# Patient Record
Sex: MALE | Race: ASIAN | NOT HISPANIC OR LATINO
[De-identification: names, ages, dates, MRNs, and addresses within clinical notes are randomized per-mention and may not be internally consistent; named-entity substitution may affect disease eponyms.]

---

## 2017-10-20 ENCOUNTER — TRANSCRIPTION ENCOUNTER (OUTPATIENT)
Age: 42
End: 2017-10-20

## 2017-10-21 ENCOUNTER — TRANSCRIPTION ENCOUNTER (OUTPATIENT)
Age: 42
End: 2017-10-21

## 2017-10-21 ENCOUNTER — INPATIENT (INPATIENT)
Facility: HOSPITAL | Age: 42
LOS: 0 days | Discharge: HOME | DRG: 340 | End: 2017-10-22
Attending: SURGERY | Admitting: SURGERY
Payer: COMMERCIAL

## 2017-10-21 VITALS
TEMPERATURE: 98 F | HEART RATE: 93 BPM | HEIGHT: 70 IN | RESPIRATION RATE: 16 BRPM | SYSTOLIC BLOOD PRESSURE: 121 MMHG | WEIGHT: 199.96 LBS | DIASTOLIC BLOOD PRESSURE: 84 MMHG | OXYGEN SATURATION: 99 %

## 2017-10-21 DIAGNOSIS — K35.3 ACUTE APPENDICITIS WITH LOCALIZED PERITONITIS: ICD-10-CM

## 2017-10-21 DIAGNOSIS — K35.80 UNSPECIFIED ACUTE APPENDICITIS: ICD-10-CM

## 2017-10-21 LAB
ALBUMIN SERPL ELPH-MCNC: 4.4 G/DL — SIGNIFICANT CHANGE UP (ref 3.3–5)
ALP SERPL-CCNC: 58 U/L — SIGNIFICANT CHANGE UP (ref 40–120)
ALT FLD-CCNC: 28 U/L — SIGNIFICANT CHANGE UP (ref 12–78)
ANION GAP SERPL CALC-SCNC: 9 MMOL/L — SIGNIFICANT CHANGE UP (ref 5–17)
APPEARANCE UR: ABNORMAL
APTT BLD: 28.8 SEC — SIGNIFICANT CHANGE UP (ref 27.5–37.4)
AST SERPL-CCNC: 12 U/L — LOW (ref 15–37)
BACTERIA # UR AUTO: ABNORMAL
BASOPHILS # BLD AUTO: 0.1 K/UL — SIGNIFICANT CHANGE UP (ref 0–0.2)
BASOPHILS NFR BLD AUTO: 0.5 % — SIGNIFICANT CHANGE UP (ref 0–2)
BILIRUB SERPL-MCNC: 0.9 MG/DL — SIGNIFICANT CHANGE UP (ref 0.2–1.2)
BILIRUB UR-MCNC: NEGATIVE — SIGNIFICANT CHANGE UP
BLD GP AB SCN SERPL QL: SIGNIFICANT CHANGE UP
BUN SERPL-MCNC: 15 MG/DL — SIGNIFICANT CHANGE UP (ref 7–23)
CALCIUM SERPL-MCNC: 9.3 MG/DL — SIGNIFICANT CHANGE UP (ref 8.5–10.1)
CHLORIDE SERPL-SCNC: 100 MMOL/L — SIGNIFICANT CHANGE UP (ref 96–108)
CO2 SERPL-SCNC: 30 MMOL/L — SIGNIFICANT CHANGE UP (ref 22–31)
COLOR SPEC: YELLOW — SIGNIFICANT CHANGE UP
COMMENT - URINE: SIGNIFICANT CHANGE UP
CREAT SERPL-MCNC: 1.1 MG/DL — SIGNIFICANT CHANGE UP (ref 0.5–1.3)
DIFF PNL FLD: ABNORMAL
EOSINOPHIL # BLD AUTO: 0 K/UL — SIGNIFICANT CHANGE UP (ref 0–0.5)
EOSINOPHIL NFR BLD AUTO: 0.1 % — SIGNIFICANT CHANGE UP (ref 0–6)
EPI CELLS # UR: SIGNIFICANT CHANGE UP
GLUCOSE SERPL-MCNC: 107 MG/DL — HIGH (ref 70–99)
GLUCOSE UR QL: NEGATIVE — SIGNIFICANT CHANGE UP
HCT VFR BLD CALC: 51.3 % — HIGH (ref 39–50)
HGB BLD-MCNC: 17.3 G/DL — HIGH (ref 13–17)
INR BLD: 1.07 RATIO — SIGNIFICANT CHANGE UP (ref 0.88–1.16)
KETONES UR-MCNC: ABNORMAL
LACTATE SERPL-SCNC: 1.4 MMOL/L — SIGNIFICANT CHANGE UP (ref 0.7–2)
LEUKOCYTE ESTERASE UR-ACNC: ABNORMAL
LIDOCAIN IGE QN: 187 U/L — SIGNIFICANT CHANGE UP (ref 73–393)
LYMPHOCYTES # BLD AUTO: 1.9 K/UL — SIGNIFICANT CHANGE UP (ref 1–3.3)
LYMPHOCYTES # BLD AUTO: 12 % — LOW (ref 13–44)
MCHC RBC-ENTMCNC: 30 PG — SIGNIFICANT CHANGE UP (ref 27–34)
MCHC RBC-ENTMCNC: 33.8 GM/DL — SIGNIFICANT CHANGE UP (ref 32–36)
MCV RBC AUTO: 88.9 FL — SIGNIFICANT CHANGE UP (ref 80–100)
MONOCYTES # BLD AUTO: 1.1 K/UL — HIGH (ref 0–0.9)
MONOCYTES NFR BLD AUTO: 6.8 % — SIGNIFICANT CHANGE UP (ref 1–9)
NEUTROPHILS # BLD AUTO: 12.8 K/UL — HIGH (ref 1.8–7.4)
NEUTROPHILS NFR BLD AUTO: 80.7 % — HIGH (ref 43–77)
NITRITE UR-MCNC: NEGATIVE — SIGNIFICANT CHANGE UP
PH UR: 7 — SIGNIFICANT CHANGE UP (ref 5–8)
PLATELET # BLD AUTO: 224 K/UL — SIGNIFICANT CHANGE UP (ref 150–400)
POTASSIUM SERPL-MCNC: 3.8 MMOL/L — SIGNIFICANT CHANGE UP (ref 3.5–5.3)
POTASSIUM SERPL-SCNC: 3.8 MMOL/L — SIGNIFICANT CHANGE UP (ref 3.5–5.3)
PROT SERPL-MCNC: 8.8 G/DL — HIGH (ref 6–8.3)
PROT UR-MCNC: NEGATIVE — SIGNIFICANT CHANGE UP
PROTHROM AB SERPL-ACNC: 11.7 SEC — SIGNIFICANT CHANGE UP (ref 9.8–12.7)
RBC # BLD: 5.77 M/UL — SIGNIFICANT CHANGE UP (ref 4.2–5.8)
RBC # FLD: 11.4 % — SIGNIFICANT CHANGE UP (ref 10.3–14.5)
RBC CASTS # UR COMP ASSIST: ABNORMAL /HPF (ref 0–4)
SODIUM SERPL-SCNC: 139 MMOL/L — SIGNIFICANT CHANGE UP (ref 135–145)
SP GR SPEC: 1.01 — SIGNIFICANT CHANGE UP (ref 1.01–1.02)
UROBILINOGEN FLD QL: NEGATIVE — SIGNIFICANT CHANGE UP
WBC # BLD: 15.9 K/UL — HIGH (ref 3.8–10.5)
WBC # FLD AUTO: 15.9 K/UL — HIGH (ref 3.8–10.5)
WBC UR QL: SIGNIFICANT CHANGE UP

## 2017-10-21 PROCEDURE — 88304 TISSUE EXAM BY PATHOLOGIST: CPT | Mod: 26

## 2017-10-21 PROCEDURE — 99285 EMERGENCY DEPT VISIT HI MDM: CPT

## 2017-10-21 PROCEDURE — 93010 ELECTROCARDIOGRAM REPORT: CPT

## 2017-10-21 PROCEDURE — 74176 CT ABD & PELVIS W/O CONTRAST: CPT | Mod: 26

## 2017-10-21 RX ORDER — SODIUM CHLORIDE 9 MG/ML
3 INJECTION INTRAMUSCULAR; INTRAVENOUS; SUBCUTANEOUS ONCE
Qty: 0 | Refills: 0 | Status: COMPLETED | OUTPATIENT
Start: 2017-10-21 | End: 2017-10-21

## 2017-10-21 RX ORDER — ONDANSETRON 8 MG/1
4 TABLET, FILM COATED ORAL EVERY 6 HOURS
Qty: 0 | Refills: 0 | Status: DISCONTINUED | OUTPATIENT
Start: 2017-10-21 | End: 2017-10-22

## 2017-10-21 RX ORDER — SODIUM CHLORIDE 9 MG/ML
1000 INJECTION INTRAMUSCULAR; INTRAVENOUS; SUBCUTANEOUS
Qty: 0 | Refills: 0 | Status: DISCONTINUED | OUTPATIENT
Start: 2017-10-21 | End: 2017-10-21

## 2017-10-21 RX ORDER — MORPHINE SULFATE 50 MG/1
4 CAPSULE, EXTENDED RELEASE ORAL ONCE
Qty: 0 | Refills: 0 | Status: DISCONTINUED | OUTPATIENT
Start: 2017-10-21 | End: 2017-10-21

## 2017-10-21 RX ORDER — SODIUM CHLORIDE 9 MG/ML
1000 INJECTION INTRAMUSCULAR; INTRAVENOUS; SUBCUTANEOUS ONCE
Qty: 0 | Refills: 0 | Status: DISCONTINUED | OUTPATIENT
Start: 2017-10-21 | End: 2017-10-22

## 2017-10-21 RX ORDER — OXYCODONE AND ACETAMINOPHEN 5; 325 MG/1; MG/1
2 TABLET ORAL EVERY 4 HOURS
Qty: 0 | Refills: 0 | Status: DISCONTINUED | OUTPATIENT
Start: 2017-10-21 | End: 2017-10-22

## 2017-10-21 RX ORDER — SODIUM CHLORIDE 9 MG/ML
1000 INJECTION, SOLUTION INTRAVENOUS
Qty: 0 | Refills: 0 | Status: DISCONTINUED | OUTPATIENT
Start: 2017-10-21 | End: 2017-10-22

## 2017-10-21 RX ORDER — ENOXAPARIN SODIUM 100 MG/ML
40 INJECTION SUBCUTANEOUS DAILY
Qty: 0 | Refills: 0 | Status: DISCONTINUED | OUTPATIENT
Start: 2017-10-21 | End: 2017-10-22

## 2017-10-21 RX ORDER — ONDANSETRON 8 MG/1
4 TABLET, FILM COATED ORAL ONCE
Qty: 0 | Refills: 0 | Status: COMPLETED | OUTPATIENT
Start: 2017-10-21 | End: 2017-10-21

## 2017-10-21 RX ORDER — PIPERACILLIN AND TAZOBACTAM 4; .5 G/20ML; G/20ML
3.38 INJECTION, POWDER, LYOPHILIZED, FOR SOLUTION INTRAVENOUS ONCE
Qty: 0 | Refills: 0 | Status: DISCONTINUED | OUTPATIENT
Start: 2017-10-21 | End: 2017-10-21

## 2017-10-21 RX ORDER — INFLUENZA VIRUS VACCINE 15; 15; 15; 15 UG/.5ML; UG/.5ML; UG/.5ML; UG/.5ML
0.5 SUSPENSION INTRAMUSCULAR ONCE
Qty: 0 | Refills: 0 | Status: DISCONTINUED | OUTPATIENT
Start: 2017-10-21 | End: 2017-10-22

## 2017-10-21 RX ORDER — ATORVASTATIN CALCIUM 80 MG/1
0 TABLET, FILM COATED ORAL
Qty: 0 | Refills: 0 | COMMUNITY

## 2017-10-21 RX ORDER — ACETAMINOPHEN 500 MG
650 TABLET ORAL EVERY 6 HOURS
Qty: 0 | Refills: 0 | Status: DISCONTINUED | OUTPATIENT
Start: 2017-10-21 | End: 2017-10-22

## 2017-10-21 RX ORDER — HYDROMORPHONE HYDROCHLORIDE 2 MG/ML
0.5 INJECTION INTRAMUSCULAR; INTRAVENOUS; SUBCUTANEOUS
Qty: 0 | Refills: 0 | Status: DISCONTINUED | OUTPATIENT
Start: 2017-10-21 | End: 2017-10-21

## 2017-10-21 RX ORDER — HYDROMORPHONE HYDROCHLORIDE 2 MG/ML
1 INJECTION INTRAMUSCULAR; INTRAVENOUS; SUBCUTANEOUS
Qty: 0 | Refills: 0 | Status: DISCONTINUED | OUTPATIENT
Start: 2017-10-21 | End: 2017-10-22

## 2017-10-21 RX ADMIN — SODIUM CHLORIDE 125 MILLILITER(S): 9 INJECTION INTRAMUSCULAR; INTRAVENOUS; SUBCUTANEOUS at 13:14

## 2017-10-21 RX ADMIN — HYDROMORPHONE HYDROCHLORIDE 0.5 MILLIGRAM(S): 2 INJECTION INTRAMUSCULAR; INTRAVENOUS; SUBCUTANEOUS at 17:40

## 2017-10-21 RX ADMIN — HYDROMORPHONE HYDROCHLORIDE 0.5 MILLIGRAM(S): 2 INJECTION INTRAMUSCULAR; INTRAVENOUS; SUBCUTANEOUS at 18:10

## 2017-10-21 RX ADMIN — MORPHINE SULFATE 4 MILLIGRAM(S): 50 CAPSULE, EXTENDED RELEASE ORAL at 15:09

## 2017-10-21 RX ADMIN — ONDANSETRON 4 MILLIGRAM(S): 8 TABLET, FILM COATED ORAL at 13:14

## 2017-10-21 RX ADMIN — SODIUM CHLORIDE 3 MILLILITER(S): 9 INJECTION INTRAMUSCULAR; INTRAVENOUS; SUBCUTANEOUS at 13:17

## 2017-10-21 RX ADMIN — SODIUM CHLORIDE 125 MILLILITER(S): 9 INJECTION, SOLUTION INTRAVENOUS at 20:18

## 2017-10-21 RX ADMIN — MORPHINE SULFATE 4 MILLIGRAM(S): 50 CAPSULE, EXTENDED RELEASE ORAL at 14:11

## 2017-10-21 RX ADMIN — SODIUM CHLORIDE 100 MILLILITER(S): 9 INJECTION INTRAMUSCULAR; INTRAVENOUS; SUBCUTANEOUS at 17:40

## 2017-10-21 RX ADMIN — MORPHINE SULFATE 4 MILLIGRAM(S): 50 CAPSULE, EXTENDED RELEASE ORAL at 13:14

## 2017-10-21 NOTE — DISCHARGE NOTE ADULT - CARE PLAN
Principal Discharge DX:	Acute appendicitis with localized peritonitis  Goal:	Relief of pain  Instructions for follow-up, activity and diet:	low cholesterol diet.  No heavy lifting or strenuous exercise for 4 weeks.  No driving or operating heavy machinery while taking narcotics.  May shower in 48 hours.  Leave steri strips.  If they fall off, its ok. f/u with Dr. Rubio in 10 days  Secondary Diagnosis:	Hyperlipidemia, unspecified hyperlipidemia type

## 2017-10-21 NOTE — ED PROVIDER NOTE - NSCAREINITIATED _GEN_ER
seen and examined with fellow. I agree with H & P, A & P.  Possible post op arrhythmia from his noncardiac surgery.  Agree with Xarelto and KAREN guided D[facility]. Gael Stack(Attending)

## 2017-10-21 NOTE — H&P ADULT - HISTORY OF PRESENT ILLNESS
42 y.o. M c/o abd pain which began in the epigastrium ~ 18 hours ago, then localized to the RLQ today.  He vomited multiple times.  He denies change in bowel habits.  He had eaten some appetizers for dinner before the pain started.  He denies fever or chills.  He denies back pain.  He denies dysuria or hematuria.  He has never had an upper or lower endoscopy.  He denies sick contacts or recent travel.

## 2017-10-21 NOTE — ED PROVIDER NOTE - PROGRESS NOTE DETAILS
dr gama paged on call surgery  there was no call from radiology regarding the ct, results found on review of pt data

## 2017-10-21 NOTE — BRIEF OPERATIVE NOTE - PROCEDURE
<<-----Click on this checkbox to enter Procedure Laparoscopic appendectomy  10/21/2017    Active  CARLOS

## 2017-10-21 NOTE — ED ADULT TRIAGE NOTE - CHIEF COMPLAINT QUOTE
pt was sent from urgent care for r/o appy, RUQ pain with nausea since last night pt was sent from urgent care for r/o appy, RLQ pain with nausea since last night

## 2017-10-21 NOTE — H&P ADULT - PROBLEM SELECTOR PLAN 1
Admit, NPO, IVF, IV Abx   I recommended appendectomy.  I explained that I will attempt to perform the procedure laparoscopically, but that there is always a risk of need for conversion to an open procedure.  I explained that the risks of the procedure are including, but not limited to, bleeding, infection, visceral or ureteral injury, and hernia.  I explained that if there is any evidence of pus, gangrene, or perforation, that he would require extended hospitalization for intravenous antibiotics.  He agreed for the procedure.  He will void immediately prior to the procedure.

## 2017-10-21 NOTE — H&P ADULT - NSHPREVIEWOFSYSTEMS_GEN_ALL_CORE
The patient denies headache, dizziness, sore throat, nasal congestion, chest pain, SOB, extremity pain, numbness, tingling, or weakness.

## 2017-10-21 NOTE — H&P ADULT - NSHPPHYSICALEXAM_GEN_ALL_CORE
Vital Signs Last 24 Hrs  T(C): 36.8 (21 Oct 2017 11:50), Max: 36.8 (21 Oct 2017 11:50)  T(F): 98.2 (21 Oct 2017 11:50), Max: 98.2 (21 Oct 2017 11:50)  HR: 97 (21 Oct 2017 14:44) (93 - 97)  BP: 106/68 (21 Oct 2017 14:44) (106/68 - 121/84)  BP(mean): --  RR: 15 (21 Oct 2017 14:44) (15 - 16)  SpO2: 98% (21 Oct 2017 14:44) (98% - 99%)      Physical Exam:  General: AAOx3; Comfortable  HEENT: No jaundice or icterus; No cervical adenopathy  Lungs: BCTA  Heart: RRR  Abd: Soft, ND, Mild LLQ tenderness; Mod RLQ tenderness; No guarding, rebound tenderness, or rigidity; + Psoas sign: neg Obturator and Rovsing's signs; No hernias; No scars  Back: No CVA tenderness  Extremities: No edema or calf tenderness; Good pulses b/l

## 2017-10-21 NOTE — DISCHARGE NOTE ADULT - HOSPITAL COURSE
Admitted, kept NPO.  Given IV hydration and IV abx.  Underwent uncomplicated surgery.  Ambulated.  Voided.  Discharged home when pain controlled.

## 2017-10-21 NOTE — DISCHARGE NOTE ADULT - MEDICATION SUMMARY - MEDICATIONS TO TAKE
I will START or STAY ON the medications listed below when I get home from the hospital:    oxyCODONE-acetaminophen 5 mg-325 mg oral tablet  -- 1 tab(s) by mouth every 4 hours (5 times/day), As Needed -for severe pain MDD:12 tabs  -- Caution federal law prohibits the transfer of this drug to any person other  than the person for whom it was prescribed.  May cause drowsiness.  Alcohol may intensify this effect.  Use care when operating dangerous machinery.  This prescription cannot be refilled.  This product contains acetaminophen.  Do not use  with any other product containing acetaminophen to prevent possible liver damage.  Using more of this medication than prescribed may cause serious breathing problems.    -- Indication: For Acute appendicitis I will START or STAY ON the medications listed below when I get home from the hospital:    oxyCODONE-acetaminophen 5 mg-325 mg oral tablet  -- 1 tab(s) by mouth every 4 hours (5 times/day), As Needed -for severe pain MDD:12 tabs  -- Caution federal law prohibits the transfer of this drug to any person other  than the person for whom it was prescribed.  May cause drowsiness.  Alcohol may intensify this effect.  Use care when operating dangerous machinery.  This prescription cannot be refilled.  This product contains acetaminophen.  Do not use  with any other product containing acetaminophen to prevent possible liver damage.  Using more of this medication than prescribed may cause serious breathing problems.    -- Indication: For pain

## 2017-10-21 NOTE — DISCHARGE NOTE ADULT - PATIENT PORTAL LINK FT
“You can access the [employer] Patient Portal, offered by [facility], by registering with the following website: [email or web address]”

## 2017-10-21 NOTE — DISCHARGE NOTE ADULT - PLAN OF CARE
Relief of pain low cholesterol diet.  No heavy lifting or strenuous exercise for 4 weeks.  No driving or operating heavy machinery while taking narcotics.  May shower in 48 hours.  Leave steri strips.  If they fall off, its ok. f/u with Dr. Rubio in 10 days

## 2017-10-21 NOTE — ED PROVIDER NOTE - CONSTITUTIONAL, MLM
normal... Well appearing, well nourished, awake, alert, oriented to person, place, time/situation holding abd in pain

## 2017-10-21 NOTE — H&P ADULT - NSHPLABSRESULTS_GEN_ALL_CORE
LABS:  CBC Full  -  ( 21 Oct 2017 13:26 )  WBC Count : 15.9 K/uL  Hemoglobin : 17.3 g/dL  Hematocrit : 51.3 %  Platelet Count - Automated : 224 K/uL  Mean Cell Volume : 88.9 fl  Mean Cell Hemoglobin : 30.0 pg  Mean Cell Hemoglobin Concentration : 33.8 gm/dL  Auto Neutrophil # : 12.8 K/uL  Auto Lymphocyte # : 1.9 K/uL  Auto Monocyte # : 1.1 K/uL  Auto Eosinophil # : 0.0 K/uL  Auto Basophil # : 0.1 K/uL  Auto Neutrophil % : 80.7 %  Auto Lymphocyte % : 12.0 %  Auto Monocyte % : 6.8 %  Auto Eosinophil % : 0.1 %  Auto Basophil % : 0.5 %    10-21    139  |  100  |  15  ----------------------------<  107<H>  3.8   |  30  |  1.10    Ca    9.3      21 Oct 2017 13:26    TPro  8.8<H>  /  Alb  4.4  /  TBili  0.9  /  DBili  x   /  AST  12<L>  /  ALT  28  /  AlkPhos  58  10-21    PT/INR - ( 21 Oct 2017 13:26 )   PT: 11.7 sec;   INR: 1.07 ratio         PTT - ( 21 Oct 2017 13:26 )  PTT:28.8 sec  Urinalysis B    RADIOLOGY & ADDITIONAL STUDIES:  < from: CT Abdomen and Pelvis No Cont (10.21.17 @ 14:05) >    EXAM:  CT ABDOMEN AND PELVIS                            PROCEDURE DATE:  10/21/2017          INTERPRETATION:  CLINICAL INFORMATION: Right lower quadrant abdominal pain    COMPARISON: None    PROCEDURE:   CT of the Abdomen and Pelvis was performed without intravenous contrast.   Intravenous contrast: None.  Oral contrast: None.  Sagittal and coronal reformats were performed.    FINDINGS:    LOWER CHEST: Bilateral lower lobe atelectasis. Several nonspecific lung   base nodules measuring up to 2-3mm.    Please note that evaluation of the abdominal organs and vascular   structures is limited by lack of intravenous contrast.    LIVER: Within normal limits.  BILE DUCTS: Normal caliber.  GALLBLADDER: Within normal limits.  SPLEEN: Within normal limits.  PANCREAS: Within normal limits.  ADRENALS: Within normal limits.  KIDNEYS/URETERS: Within normal limits.    BLADDER: Within normal limits.  REPRODUCTIVE ORGANS: The prostate is within normal limits.    BOWEL: No bowel obstruction. Appendix dilated up to 1.2 cm with wall   thickening and periappendiceal inflammatory changes.  PERITONEUM: No ascites.  VESSELS:  Mild atherosclerotic changes.  RETROPERITONEUM: No lymphadenopathy.    ABDOMINAL WALL: Fat-containing umbilical and left inguinal hernias.  BONES: Spinal degenerative changes.    IMPRESSION:     Acute appendicitis.        KLAUS PACHECO M.D., ATTENDING RADIOLOGIST  This document has been electronically signed. Oct 21 2017  2:16PM      I reviewed the films myself and agree with the report

## 2017-10-21 NOTE — DISCHARGE NOTE ADULT - CARE PROVIDER_API CALL
Lupe Rubio (MD), Surgery  [facility]  [address]  [place] [address]  Phone: [phone]  Fax: [phone]

## 2017-10-22 VITALS
SYSTOLIC BLOOD PRESSURE: 94 MMHG | OXYGEN SATURATION: 97 % | TEMPERATURE: 98 F | HEART RATE: 64 BPM | RESPIRATION RATE: 16 BRPM | DIASTOLIC BLOOD PRESSURE: 58 MMHG

## 2017-10-22 DIAGNOSIS — E78.5 HYPERLIPIDEMIA, UNSPECIFIED: ICD-10-CM

## 2017-10-22 LAB
CULTURE RESULTS: NO GROWTH — SIGNIFICANT CHANGE UP
SPECIMEN SOURCE: SIGNIFICANT CHANGE UP

## 2017-10-22 PROCEDURE — 96375 TX/PRO/DX INJ NEW DRUG ADDON: CPT

## 2017-10-22 PROCEDURE — 83605 ASSAY OF LACTIC ACID: CPT

## 2017-10-22 PROCEDURE — 81001 URINALYSIS AUTO W/SCOPE: CPT

## 2017-10-22 PROCEDURE — 87086 URINE CULTURE/COLONY COUNT: CPT

## 2017-10-22 PROCEDURE — 85610 PROTHROMBIN TIME: CPT

## 2017-10-22 PROCEDURE — 74176 CT ABD & PELVIS W/O CONTRAST: CPT

## 2017-10-22 PROCEDURE — 86901 BLOOD TYPING SEROLOGIC RH(D): CPT

## 2017-10-22 PROCEDURE — 85027 COMPLETE CBC AUTOMATED: CPT

## 2017-10-22 PROCEDURE — 96376 TX/PRO/DX INJ SAME DRUG ADON: CPT

## 2017-10-22 PROCEDURE — 96374 THER/PROPH/DIAG INJ IV PUSH: CPT

## 2017-10-22 PROCEDURE — 86900 BLOOD TYPING SEROLOGIC ABO: CPT

## 2017-10-22 PROCEDURE — 83690 ASSAY OF LIPASE: CPT

## 2017-10-22 PROCEDURE — 86850 RBC ANTIBODY SCREEN: CPT

## 2017-10-22 PROCEDURE — 93005 ELECTROCARDIOGRAM TRACING: CPT

## 2017-10-22 PROCEDURE — 36415 COLL VENOUS BLD VENIPUNCTURE: CPT

## 2017-10-22 PROCEDURE — 80053 COMPREHEN METABOLIC PANEL: CPT

## 2017-10-22 PROCEDURE — 88304 TISSUE EXAM BY PATHOLOGIST: CPT

## 2017-10-22 PROCEDURE — 85730 THROMBOPLASTIN TIME PARTIAL: CPT

## 2017-10-22 PROCEDURE — 99285 EMERGENCY DEPT VISIT HI MDM: CPT | Mod: 25

## 2017-10-22 RX ADMIN — OXYCODONE AND ACETAMINOPHEN 2 TABLET(S): 5; 325 TABLET ORAL at 05:10

## 2017-10-22 RX ADMIN — OXYCODONE AND ACETAMINOPHEN 2 TABLET(S): 5; 325 TABLET ORAL at 04:36

## 2017-10-22 NOTE — PROGRESS NOTE ADULT - SUBJECTIVE AND OBJECTIVE BOX
GENERAL SURGERY Progress Note    HPI: The patient denies complaints.  Ambulating, voiding, tolerating diet    MEDICATIONS  (STANDING):  enoxaparin Injectable 40 milliGRAM(s) SubCutaneous daily  influenza   Vaccine 0.5 milliLiter(s) IntraMuscular once  lactated ringers. 1000 milliLiter(s) (125 mL/Hr) IV Continuous <Continuous>  sodium chloride 0.9% Bolus 1000 milliLiter(s) IV Bolus once    MEDICATIONS  (acetaminophen   Tablet 650 milliGRAM(s) Oral every 6 hours PRN For Temp greater than 38.5 C (101.3 F)  HYDROmorphone  Injectable 1 milliGRAM(s) IV Push every 3 hours PRN Severe Pain (7 - 10)  ondansetron Injectable 4 milliGRAM(s) IV Push every 6 hours PRN Nausea and/or Vomiting  oxyCODONE    5 mG/acetaminophen 325 mG 2 Tablet(s) Oral every 4 hours PRN Moderate Pain (4 - 6)      Vital Signs Last 24 Hrs  T(C): 36.7 (22 Oct 2017 07:21), Max: 37.3 (21 Oct 2017 18:05)  T(F): 98.1 (22 Oct 2017 07:21), Max: 99.2 (22 Oct 2017 00:18)  HR: 64 (22 Oct 2017 07:21) (64 - 100)  BP: 94/58 (22 Oct 2017 07:21) (94/58 - 132/83)  BP(mean): --  RR: 16 (22 Oct 2017 07:21) (14 - 21)  SpO2: 97% (22 Oct 2017 07:21) (95% - 100%)    I&O's Detail    21 Oct 2017 07:01  -  22 Oct 2017 07:00  --------------------------------------------------------  IN:    lactated ringers.: 1475 mL    Oral Fluid: 100 mL  Total IN: 1575 mL    OUT:    Voided: 525 mL  Total OUT: 525 mL    Total NET: 1050 mL        Physical Exam:  Abd: Soft, ND, Appropriately tender; Steri strips clean and dry      LABS:  CBC Full  -  ( 21 Oct 2017 13:26 )  WBC Count : 15.9 K/uL  Hemoglobin : 17.3 g/dL  Hematocrit : 51.3 %  Platelet Count - Automated : 224 K/uL  Mean Cell Volume : 88.9 fl  Mean Cell Hemoglobin : 30.0 pg  Mean Cell Hemoglobin Concentration : 33.8 gm/dL  Auto Neutrophil # : 12.8 K/uL  Auto Lymphocyte # : 1.9 K/uL  Auto Monocyte # : 1.1 K/uL  Auto Eosinophil # : 0.0 K/uL  Auto Basophil # : 0.1 K/uL  Auto Neutrophil % : 80.7 %  Auto Lymphocyte % : 12.0 %  Auto Monocyte % : 6.8 %  Auto Eosinophil % : 0.1 %  Auto Basophil % : 0.5 %    10-21    139  |  100  |  15  ----------------------------<  107<H>  3.8   |  30  |  1.10    Ca    9.3      21 Oct 2017 13:26    TPro  8.8<H>  /  Alb  4.4  /  TBili  0.9  /  DBili  x   /  AST  12<L>  /  ALT  28  /  AlkPhos  58  10-    PT/INR - ( 21 Oct 2017 13:26 )   PT: 11.7 sec;   INR: 1.07 ratio         PTT - ( 21 Oct 2017 13:26 )  PTT:28.8 sec  Urinalysis Basic - ( 21 Oct 2017 13:26 )    Color: Yellow / Appearance: Slightly Turbid / S.010 / pH: x  Gluc: x / Ketone: Small  / Bili: Negative / Urobili: Negative   Blood: x / Protein: Negative / Nitrite: Negative   Leuk Esterase: Trace / RBC: 6-10 /HPF / WBC 3-5   Sq Epi: x / Non Sq Epi: Occasional / Bacteria: Few      LIVER FUNCTIONS - ( 21 Oct 2017 13:26 )  Alb: 4.4 g/dL / Pro: 8.8 g/dL / ALK PHOS: 58 U/L / ALT: 28 U/L / AST: 12 U/L / GGT: x

## 2017-10-22 NOTE — PROGRESS NOTE ADULT - SUBJECTIVE AND OBJECTIVE BOX
The patient was interviewed and evaluated  42y Male    Vital Signs Last 24 Hrs  T(C): 36.7 (22 Oct 2017 07:21), Max: 37.3 (21 Oct 2017 18:05)  T(F): 98.1 (22 Oct 2017 07:21), Max: 99.2 (22 Oct 2017 00:18)  HR: 64 (22 Oct 2017 07:21) (64 - 100)  BP: 94/58 (22 Oct 2017 07:21) (94/58 - 132/83)  BP(mean): --  RR: 16 (22 Oct 2017 07:21) (14 - 21)  SpO2: 97% (22 Oct 2017 07:21) (95% - 100%)    Pt seen, doing well, no anesthesia complications or complaints noted or reported.   No Nausea    No additional recommendations.     Pain well controlled

## 2017-10-24 LAB — SURGICAL PATHOLOGY FINAL REPORT - CH: SIGNIFICANT CHANGE UP

## 2017-10-25 DIAGNOSIS — E78.5 HYPERLIPIDEMIA, UNSPECIFIED: ICD-10-CM

## 2017-10-25 DIAGNOSIS — K35.3 ACUTE APPENDICITIS WITH LOCALIZED PERITONITIS: ICD-10-CM

## 2018-11-29 ENCOUNTER — EMERGENCY (EMERGENCY)
Facility: HOSPITAL | Age: 43
LOS: 1 days | Discharge: HOME | End: 2018-11-29
Attending: EMERGENCY MEDICINE | Admitting: EMERGENCY MEDICINE
Payer: COMMERCIAL

## 2018-11-29 VITALS
SYSTOLIC BLOOD PRESSURE: 120 MMHG | WEIGHT: 214.95 LBS | RESPIRATION RATE: 20 BRPM | OXYGEN SATURATION: 97 % | TEMPERATURE: 99 F | HEART RATE: 85 BPM | DIASTOLIC BLOOD PRESSURE: 75 MMHG | HEIGHT: 69 IN

## 2018-11-29 VITALS
OXYGEN SATURATION: 98 % | DIASTOLIC BLOOD PRESSURE: 68 MMHG | RESPIRATION RATE: 17 BRPM | HEART RATE: 76 BPM | SYSTOLIC BLOOD PRESSURE: 112 MMHG

## 2018-11-29 DIAGNOSIS — Z90.49 ACQUIRED ABSENCE OF OTHER SPECIFIED PARTS OF DIGESTIVE TRACT: Chronic | ICD-10-CM

## 2018-11-29 PROBLEM — E78.5 HYPERLIPIDEMIA, UNSPECIFIED: Chronic | Status: ACTIVE | Noted: 2017-10-21

## 2018-11-29 LAB
ALBUMIN SERPL ELPH-MCNC: 3.9 G/DL — SIGNIFICANT CHANGE UP (ref 3.3–5)
ALP SERPL-CCNC: 32 U/L — LOW (ref 40–120)
ALT FLD-CCNC: 39 U/L — SIGNIFICANT CHANGE UP (ref 12–78)
ANION GAP SERPL CALC-SCNC: 12 MMOL/L — SIGNIFICANT CHANGE UP (ref 5–17)
APTT BLD: 25.8 SEC — LOW (ref 27.5–36.3)
AST SERPL-CCNC: 18 U/L — SIGNIFICANT CHANGE UP (ref 15–37)
BASOPHILS # BLD AUTO: 0.15 K/UL — SIGNIFICANT CHANGE UP (ref 0–0.2)
BASOPHILS NFR BLD AUTO: 1 % — SIGNIFICANT CHANGE UP (ref 0–2)
BILIRUB SERPL-MCNC: 0.4 MG/DL — SIGNIFICANT CHANGE UP (ref 0.2–1.2)
BUN SERPL-MCNC: 21 MG/DL — SIGNIFICANT CHANGE UP (ref 7–23)
CALCIUM SERPL-MCNC: 8.4 MG/DL — LOW (ref 8.5–10.1)
CHLORIDE SERPL-SCNC: 105 MMOL/L — SIGNIFICANT CHANGE UP (ref 96–108)
CK MB BLD-MCNC: <1 % — SIGNIFICANT CHANGE UP (ref 0–3.5)
CK MB BLD-MCNC: <1.2 % — SIGNIFICANT CHANGE UP (ref 0–3.5)
CK MB BLD-MCNC: <1.2 % — SIGNIFICANT CHANGE UP (ref 0–3.5)
CK MB CFR SERPL CALC: <1 NG/ML — SIGNIFICANT CHANGE UP (ref 0–3.6)
CK SERPL-CCNC: 80 U/L — SIGNIFICANT CHANGE UP (ref 26–308)
CK SERPL-CCNC: 81 U/L — SIGNIFICANT CHANGE UP (ref 26–308)
CK SERPL-CCNC: 99 U/L — SIGNIFICANT CHANGE UP (ref 26–308)
CO2 SERPL-SCNC: 23 MMOL/L — SIGNIFICANT CHANGE UP (ref 22–31)
CREAT SERPL-MCNC: 1.2 MG/DL — SIGNIFICANT CHANGE UP (ref 0.5–1.3)
EOSINOPHIL # BLD AUTO: 0.3 K/UL — SIGNIFICANT CHANGE UP (ref 0–0.5)
EOSINOPHIL NFR BLD AUTO: 2 % — SIGNIFICANT CHANGE UP (ref 0–6)
GLUCOSE SERPL-MCNC: 127 MG/DL — HIGH (ref 70–99)
HCT VFR BLD CALC: 43.6 % — SIGNIFICANT CHANGE UP (ref 39–50)
HGB BLD-MCNC: 15.4 G/DL — SIGNIFICANT CHANGE UP (ref 13–17)
LYMPHOCYTES # BLD AUTO: 25 % — SIGNIFICANT CHANGE UP (ref 13–44)
LYMPHOCYTES # BLD AUTO: 3.69 K/UL — HIGH (ref 1–3.3)
MAGNESIUM SERPL-MCNC: 1.8 MG/DL — SIGNIFICANT CHANGE UP (ref 1.6–2.6)
MANUAL SMEAR VERIFICATION: SIGNIFICANT CHANGE UP
MCHC RBC-ENTMCNC: 29.6 PG — SIGNIFICANT CHANGE UP (ref 27–34)
MCHC RBC-ENTMCNC: 35.3 GM/DL — SIGNIFICANT CHANGE UP (ref 32–36)
MCV RBC AUTO: 83.7 FL — SIGNIFICANT CHANGE UP (ref 80–100)
MONOCYTES # BLD AUTO: 0.74 K/UL — SIGNIFICANT CHANGE UP (ref 0–0.9)
MONOCYTES NFR BLD AUTO: 5 % — SIGNIFICANT CHANGE UP (ref 2–14)
NEUTROPHILS # BLD AUTO: 8.42 K/UL — HIGH (ref 1.8–7.4)
NEUTROPHILS NFR BLD AUTO: 50 % — SIGNIFICANT CHANGE UP (ref 43–77)
NEUTS BAND # BLD: 7 % — SIGNIFICANT CHANGE UP (ref 0–8)
NRBC # BLD: 0 — SIGNIFICANT CHANGE UP
NRBC # BLD: SIGNIFICANT CHANGE UP /100 WBCS (ref 0–0)
PLAT MORPH BLD: NORMAL — SIGNIFICANT CHANGE UP
PLATELET # BLD AUTO: 268 K/UL — SIGNIFICANT CHANGE UP (ref 150–400)
PLATELET CLUMP BLD QL SMEAR: ABNORMAL
POTASSIUM SERPL-MCNC: 3.5 MMOL/L — SIGNIFICANT CHANGE UP (ref 3.5–5.3)
POTASSIUM SERPL-SCNC: 3.5 MMOL/L — SIGNIFICANT CHANGE UP (ref 3.5–5.3)
PROT SERPL-MCNC: 8.1 G/DL — SIGNIFICANT CHANGE UP (ref 6–8.3)
RBC # BLD: 5.21 M/UL — SIGNIFICANT CHANGE UP (ref 4.2–5.8)
RBC # FLD: 12.9 % — SIGNIFICANT CHANGE UP (ref 10.3–14.5)
RBC BLD AUTO: NORMAL — SIGNIFICANT CHANGE UP
SMUDGE CELLS # BLD: PRESENT — SIGNIFICANT CHANGE UP
SODIUM SERPL-SCNC: 140 MMOL/L — SIGNIFICANT CHANGE UP (ref 135–145)
TROPONIN I SERPL-MCNC: <.015 NG/ML — SIGNIFICANT CHANGE UP (ref 0.01–0.04)
VARIANT LYMPHS # BLD: 10 % — HIGH (ref 0–6)
WBC # BLD: 14.77 K/UL — HIGH (ref 3.8–10.5)
WBC # FLD AUTO: 14.77 K/UL — HIGH (ref 3.8–10.5)

## 2018-11-29 PROCEDURE — 71045 X-RAY EXAM CHEST 1 VIEW: CPT

## 2018-11-29 PROCEDURE — 99285 EMERGENCY DEPT VISIT HI MDM: CPT

## 2018-11-29 PROCEDURE — 80053 COMPREHEN METABOLIC PANEL: CPT

## 2018-11-29 PROCEDURE — 80307 DRUG TEST PRSMV CHEM ANLYZR: CPT

## 2018-11-29 PROCEDURE — 85027 COMPLETE CBC AUTOMATED: CPT

## 2018-11-29 PROCEDURE — 99284 EMERGENCY DEPT VISIT MOD MDM: CPT | Mod: 25

## 2018-11-29 PROCEDURE — 85730 THROMBOPLASTIN TIME PARTIAL: CPT

## 2018-11-29 PROCEDURE — 93005 ELECTROCARDIOGRAM TRACING: CPT

## 2018-11-29 PROCEDURE — 82553 CREATINE MB FRACTION: CPT

## 2018-11-29 PROCEDURE — 96374 THER/PROPH/DIAG INJ IV PUSH: CPT

## 2018-11-29 PROCEDURE — 71045 X-RAY EXAM CHEST 1 VIEW: CPT | Mod: 26

## 2018-11-29 PROCEDURE — 83735 ASSAY OF MAGNESIUM: CPT

## 2018-11-29 PROCEDURE — 36415 COLL VENOUS BLD VENIPUNCTURE: CPT

## 2018-11-29 PROCEDURE — 83690 ASSAY OF LIPASE: CPT

## 2018-11-29 PROCEDURE — 93010 ELECTROCARDIOGRAM REPORT: CPT

## 2018-11-29 PROCEDURE — 82550 ASSAY OF CK (CPK): CPT

## 2018-11-29 PROCEDURE — 96375 TX/PRO/DX INJ NEW DRUG ADDON: CPT

## 2018-11-29 PROCEDURE — 84484 ASSAY OF TROPONIN QUANT: CPT

## 2018-11-29 RX ORDER — ASPIRIN/CALCIUM CARB/MAGNESIUM 324 MG
325 TABLET ORAL ONCE
Qty: 0 | Refills: 0 | Status: COMPLETED | OUTPATIENT
Start: 2018-11-29 | End: 2018-11-29

## 2018-11-29 RX ORDER — MORPHINE SULFATE 50 MG/1
2 CAPSULE, EXTENDED RELEASE ORAL ONCE
Qty: 0 | Refills: 0 | Status: DISCONTINUED | OUTPATIENT
Start: 2018-11-29 | End: 2018-11-29

## 2018-11-29 RX ORDER — NITROGLYCERIN 6.5 MG
0.4 CAPSULE, EXTENDED RELEASE ORAL ONCE
Qty: 0 | Refills: 0 | Status: COMPLETED | OUTPATIENT
Start: 2018-11-29 | End: 2018-11-29

## 2018-11-29 RX ORDER — ONDANSETRON 8 MG/1
4 TABLET, FILM COATED ORAL ONCE
Qty: 0 | Refills: 0 | Status: COMPLETED | OUTPATIENT
Start: 2018-11-29 | End: 2018-11-29

## 2018-11-29 RX ADMIN — MORPHINE SULFATE 2 MILLIGRAM(S): 50 CAPSULE, EXTENDED RELEASE ORAL at 06:48

## 2018-11-29 RX ADMIN — Medication 30 MILLILITER(S): at 11:26

## 2018-11-29 RX ADMIN — Medication 0.4 MILLIGRAM(S): at 07:08

## 2018-11-29 RX ADMIN — Medication 325 MILLIGRAM(S): at 06:46

## 2018-11-29 RX ADMIN — MORPHINE SULFATE 2 MILLIGRAM(S): 50 CAPSULE, EXTENDED RELEASE ORAL at 09:08

## 2018-11-29 RX ADMIN — ONDANSETRON 4 MILLIGRAM(S): 8 TABLET, FILM COATED ORAL at 06:48

## 2018-11-29 NOTE — CONSULT NOTE ADULT - ASSESSMENT
43M w/pmh of HLD presenting with chest pain this morning that woke him up from sleep that lasted 1 hour. Describes pain as shooting pain in left side of chest, 10/10 in severity, without radiation. Pain is worsened with deep breathing and not affected by position or with movement. States that he had similar episode last week lasting 5-10 minutes which was less severe and improved after relaxing after he began vaccuuming. Currently, pain has improved to 5/10. Patient describes atypical chest pain worsened with deep breathing. Low suspicion for ACS with alcohol history. Consider acid reflux vs. musculoskeletal    Recommend:   - No clear evidence of acute ischemia on EKG, trops negative x 1. will f/u second set  - Check alcohol level  - check official CXR  - Lab work reveals leukocytosis, unclear etiology  - No evidence of volume overload   - No acute changes on EKG compared to previous  - BP well controlled. Not on medications.   - Monitor and replete lytes, keep K>4, Mg>2  - All other workup per primary team  - Will follow 43M w/pmh of HLD presenting with chest pain this morning that woke him up from sleep that lasted 1 hour. Describes pain as shooting pain in left side of chest, 10/10 in severity, without radiation. Pain is worsened with deep breathing and not affected by position or with movement. States that he had similar episode last week lasting 5-10 minutes which was less severe and improved after relaxing after he began vaccuuming. Currently, pain has improved to 5/10. Patient describes atypical chest pain worsened with deep breathing. Low suspicion for ACS with alcohol history. Consider acid reflux vs. musculoskeletal    Recommend:   - No clear evidence of acute ischemia on EKG, trops negative x 1. will f/u second set  - Lab work reveals leukocytosis, unclear etiology  - No evidence of volume overload   - No acute changes on EKG compared to previous  - BP well controlled. Not on medications.   - Monitor and replete lytes, keep K>4, Mg>2  - All other workup per primary team  - Recommend outpatient exercise stress test and ECHO  - Will follow 43M w/pmh of HLD presenting with chest pain this morning that woke him up from sleep that lasted 1 hour. Describes pain as shooting pain in left side of chest, 10/10 in severity, without radiation. Pain is worsened with deep breathing and not affected by position or with movement. States that he had similar episode last week lasting 5-10 minutes which was less severe and improved after relaxing after he began vaccuuming. Currently, pain has improved with sublingual ntg. Patient describes atypical chest pain worsened with deep breathing. Consider unstable angina vs. acid reflux.     Recommend:   - No clear evidence of acute ischemia on EKG, trops negative x 1. will f/u second set and if negative a third set. If second set is positive, will likely tx for cardiac cath. If third set is negative, will likely recommend outpatient exercise stress test and ECHO.  - will give maalox x1 and evaluate for improvement  - Lab work reveals leukocytosis, pt does not appear acutely ill. Likely reactive process  - No evidence of volume overload   - No acute changes on EKG compared to previous  - BP well controlled. Not on medications.   - Monitor and replete lytes, keep K>4, Mg>2  - All other workup per primary team  - Will follow 43M w/pmh of HLD presenting with chest pain this morning that woke him up from sleep that lasted 1 hour. Describes pain as shooting pain in left side of chest, 10/10 in severity, without radiation. Pain is worsened with deep breathing and not affected by position or with movement. States that he had similar episode last week lasting 5-10 minutes which was less severe and improved after relaxing after he began vaccuuming.   CP appears atypical. It is unclear if there was improvement with nitro.  Consider unstable angina vs. acid reflux.     Recommend:   - No clear evidence of acute ischemia on EKG, trops negative x 1. will f/u second set and if negative a third set. If second set is positive, will likely tx for cardiac cath. If third set is negative, will likely recommend outpatient exercise stress test and ECHO.  - will give maalox x1 and evaluate for improvement  - Lab work reveals leukocytosis, pt does not appear acutely ill. Likely reactive process  - No evidence of volume overload   - No acute changes on EKG compared to previous  - BP well controlled. Not on medications.   - ASA   - Monitor and replete lytes, keep K>4, Mg>2  - All other workup per primary team  - Will follow

## 2018-11-29 NOTE — CONSULT NOTE ADULT - ATTENDING COMMENTS
I saw and examined the patient personally. Spoke with above provider regarding this case. I reviewed the above findings completely.  I agree with the above history, physical, and plan which I have edited where appropriate.   CP appears atypical in nature. Trend Evens. No signs of Ao dissection. BP controlled. ?2/2 ETOH. If clinical status changes will consider cath.

## 2018-11-29 NOTE — ED ADULT NURSE NOTE - OBJECTIVE STATEMENT
Pt comes from triage. Pt reports left sided chest pain starting an hour before arrival which woke him up from sleep. Pt appears diaphoretic and moaning in pain. Pt breathing shallow due to pain, placed on telemetry monitor. EKG completed on arrival.

## 2018-11-29 NOTE — ED ADULT NURSE NOTE - CHPI ED NUR SYMPTOMS NEG
no congestion/no dizziness/no vomiting/no shortness of breath/no fever/no nausea/no back pain/no chills/no syncope

## 2018-11-29 NOTE — ED PROVIDER NOTE - OBJECTIVE STATEMENT
42yo male who presents with chest pain tonite. pt c/o sudden onset of chest pain that woke him up from sleep, constant, left sided, pressure, with nausea and diaphoresis, pt states he had an episode of chest pain a week ago, no other complaints

## 2018-11-29 NOTE — CONSULT NOTE ADULT - SUBJECTIVE AND OBJECTIVE BOX
[facility] Cardiology Consultants: Lula Martinez, Tyler Boudreaux, Donnell Berrios Savella    [phone] (office)    Reason for Consult: chest pain    HPI: 43M w/pmh of HLD presenting with chest pain this morning that woke him up from sleep that lasted 1 hour. Describes pain as shooting pain in left side of chest, 10/10 in severity, without radiation. Pain is worsened with deep breathing and not affected by position or with movement. States that he had similar episode last week lasting 5-10 minutes which was less severe and improved after relaxing after he began vaccuuming. Currently, pain has improved to 5/10. Denies fevers/chills, n/v, headache, sob, cough, palpitations, abdominal pain or weakness. No recent sick contacts. Last travel was to [place] last month for 4 days. Of note, patient states that he has 3-4 drinks 3-4 times a week. He had 4 gin and tonics last night, last drink was 10pm. Denies tobacco and recreational drugs.     PAST MEDICAL & SURGICAL HISTORY:  Appendicitis  HLD (hyperlipidemia)  History of appendectomy    SOCIAL HISTORY: 3-4 drinks for 3-4 times a week, had 4 gin and tonics last night, last at 10pm  Tobacco: denies  Recreational drugs: denies    FAMILY HISTORY:  Mother- HLD, HTN  Father- healthy    Home medications: statin    Allergies: No Known Allergies    REVIEW OF SYSTEMS: Negative except as per HPI.    VITAL SIGNS:   Vital Signs Last 24 Hrs  T(C): 37.1 (29 Nov 2018 06:39), Max: 37.1 (29 Nov 2018 06:39)  T(F): 98.7 (29 Nov 2018 06:39), Max: 98.7 (29 Nov 2018 06:39)  HR: 89 (29 Nov 2018 07:08) (85 - 94)  BP: 132/70 (29 Nov 2018 07:08) (112/68 - 132/70)  BP(mean): 87 (29 Nov 2018 07:08) (83 - 88)  RR: 20 (29 Nov 2018 07:08) (20 - 20)  SpO2: 97% (29 Nov 2018 07:08) (97% - 98%)    PHYSICAL EXAM:  Constitutional: NAD, well-developed, lethargic  HEENT NC/AT, moist mucous membranes  Pulmonary: Non-labored, breath sounds are clear bilaterally, no wheezing, rales or rhonchi  Cardiovascular: +S1, S2, RRR, no murmur  Gastrointestinal: Soft, nontender, nondistended, normoactive bowel sounds  Extremities: No peripheral edema   Neurological: Alert, strength and sensitivity are grossly intact  Skin: No obvious lesions/rashes  Psych: Mood & affect appropriate    LABS: All Labs Reviewed:                        15.4   14.77 )-----------( 268      ( 29 Nov 2018 06:47 )             43.6     29 Nov 2018 06:47    140    |  105    |  21     ----------------------------<  127    3.5     |  23     |  1.20     Ca    8.4        29 Nov 2018 06:47  Mg     1.8       29 Nov 2018 06:47    TPro  8.1    /  Alb  3.9    /  TBili  0.4    /  DBili  x      /  AST  18     /  ALT  39     /  AlkPhos  32     29 Nov 2018 06:47    PTT - ( 29 Nov 2018 06:47 )  PTT:25.8 sec  CARDIAC MARKERS ( 29 Nov 2018 06:47 )  <.015 ng/mL / x     / 99 U/L / x     / <1.0 ng/mL    EKG: NSR, HR 77, no ST elevations/depressions    RADIOLOGY:    CXR: grossly clear [facility] Consultants: Lula Martinez, Tyler Boudreaux, Donnell Berrios Savella    [phone] (office)    Reason for Consult: chest pain    HPI: 43M w/pmh of HLD presenting with chest pain this morning that woke him up from sleep that lasted 1 hour. Describes pain as shooting pain in left side of chest, 10/10 in severity, without radiation. Pain is worsened with deep breathing and not affected by position or with movement. States that he had similar episode last week lasting 5-10 minutes which was less severe and improved after relaxing after he began vaccuuming. Currently, pain has improved to 5/10. Denies fevers/chills, n/v, headache, sob, cough, palpitations, abdominal pain or weakness. No recent sick contacts. Last travel was to [place] last month for 4 days. Of note, patient states that he has 3-4 drinks 3-4 times a week. He had 4 gin and tonics last night, last drink was 10pm. Denies tobacco and recreational drugs.     PAST MEDICAL & SURGICAL HISTORY:  Appendicitis  HLD (hyperlipidemia)  History of appendectomy    SOCIAL HISTORY: 3-4 drinks for 3-4 times a week, had 4 gin and tonics last night, last at 10pm  Tobacco: denies  Recreational drugs: denies    FAMILY HISTORY:  Mother- HLD, HTN  Father- healthy    Home medications: statin    Allergies: No Known Allergies    REVIEW OF SYSTEMS: Negative except as per HPI.    VITAL SIGNS:   Vital Signs Last 24 Hrs  T(C): 37.1 (29 Nov 2018 06:39), Max: 37.1 (29 Nov 2018 06:39)  T(F): 98.7 (29 Nov 2018 06:39), Max: 98.7 (29 Nov 2018 06:39)  HR: 89 (29 Nov 2018 07:08) (85 - 94)  BP: 132/70 (29 Nov 2018 07:08) (112/68 - 132/70)  BP(mean): 87 (29 Nov 2018 07:08) (83 - 88)  RR: 20 (29 Nov 2018 07:08) (20 - 20)  SpO2: 97% (29 Nov 2018 07:08) (97% - 98%)    PHYSICAL EXAM:  Constitutional: NAD, well-developed, lethargic  HEENT NC/AT, moist mucous membranes  Pulmonary: Non-labored, breath sounds are clear bilaterally, no wheezing, rales or rhonchi  Cardiovascular: +S1, S2, RRR, no murmur  Gastrointestinal: Soft, nontender, nondistended, normoactive bowel sounds  Extremities: No peripheral edema   Neurological: Alert, strength and sensitivity are grossly intact  Skin: No obvious lesions/rashes  Psych: Mood & affect appropriate    LABS: All Labs Reviewed:                        15.4   14.77 )-----------( 268      ( 29 Nov 2018 06:47 )             43.6     29 Nov 2018 06:47    140    |  105    |  21     ----------------------------<  127    3.5     |  23     |  1.20     Ca    8.4        29 Nov 2018 06:47  Mg     1.8       29 Nov 2018 06:47    TPro  8.1    /  Alb  3.9    /  TBili  0.4    /  DBili  x      /  AST  18     /  ALT  39     /  AlkPhos  32     29 Nov 2018 06:47    PTT - ( 29 Nov 2018 06:47 )  PTT:25.8 sec  CARDIAC MARKERS ( 29 Nov 2018 06:47 )  <.015 ng/mL / x     / 99 U/L / x     / <1.0 ng/mL    EKG: NSR, HR 77, no ST elevations/depressions    RADIOLOGY:    CXR: clearly lungs [facility] Consultants: Lula Martinez, Tyler Boudreaux, Donnell Berrios Savella    [phone] (office)    Reason for Consult: chest pain    HPI: 43M w/pmh of HLD presenting with chest pain this morning that woke him up from sleep that lasted 1 hour. Describes pain as shooting pain in left side of chest, 10/10 in severity, without radiation. Pain is worsened with deep breathing and not affected by position or with movement. States that he had similar episode last week lasting 5-10 minutes which was less severe and improved after relaxing after he began vaccuuming. Currently, pain has improved to 5/10. Denies fevers/chills, n/v, headache, sob, cough, palpitations, abdominal pain or weakness. No recent sick contacts. Last travel was to [place] last month for 4 days. Of note, patient states that he has 3-4 drinks 3-4 times a week. He had 4 gin and tonics last night, last drink was 10pm. Denies tobacco and recreational drugs.     PAST MEDICAL & SURGICAL HISTORY:  Appendicitis  HLD (hyperlipidemia)  History of appendectomy    SOCIAL HISTORY: 3-4 drinks for 3-4 times a week, had 4 gin and tonics last night, last at 10pm  Tobacco: denies  Recreational drugs: denies    FAMILY HISTORY:  Mother- HLD, HTN  Father- healthy    Home medications: statin    Allergies: No Known Allergies    REVIEW OF SYSTEMS: Negative except as per HPI.    VITAL SIGNS:   Vital Signs Last 24 Hrs  T(C): 37.1 (29 Nov 2018 06:39), Max: 37.1 (29 Nov 2018 06:39)  T(F): 98.7 (29 Nov 2018 06:39), Max: 98.7 (29 Nov 2018 06:39)  HR: 89 (29 Nov 2018 07:08) (85 - 94)  BP: 132/70 (29 Nov 2018 07:08) (112/68 - 132/70)  BP(mean): 87 (29 Nov 2018 07:08) (83 - 88)  RR: 20 (29 Nov 2018 07:08) (20 - 20)  SpO2: 97% (29 Nov 2018 07:08) (97% - 98%)    PHYSICAL EXAM:  Constitutional: NAD, well-developed, lethargic  HEENT NC/AT, moist mucous membranes  Pulmonary: Non-labored, breath sounds are clear bilaterally, no wheezing, rales or rhonchi  Cardiovascular: +S1, S2, RRR, no murmur  Gastrointestinal: Soft, nontender, nondistended, normoactive bowel sounds  Extremities: No peripheral edema   Neurological: Alert, strength and sensitivity are grossly intact  Skin: No obvious lesions/rashes  Psych: Mood & affect appropriate for situation    LABS: All Labs Reviewed:                        15.4   14.77 )-----------( 268      ( 29 Nov 2018 06:47 )             43.6     29 Nov 2018 06:47    140    |  105    |  21     ----------------------------<  127    3.5     |  23     |  1.20     Ca    8.4        29 Nov 2018 06:47  Mg     1.8       29 Nov 2018 06:47    TPro  8.1    /  Alb  3.9    /  TBili  0.4    /  DBili  x      /  AST  18     /  ALT  39     /  AlkPhos  32     29 Nov 2018 06:47    PTT - ( 29 Nov 2018 06:47 )  PTT:25.8 sec  CARDIAC MARKERS ( 29 Nov 2018 06:47 )  <.015 ng/mL / x     / 99 U/L / x     / <1.0 ng/mL    EKG: NSR, HR 77, no ST elevations/depressions    RADIOLOGY:    CXR: clearly lungs

## 2018-12-28 NOTE — ED PROVIDER NOTE - OBJECTIVE STATEMENT
patient
Pt is a 43 yo male whose pmd is dr randi walker. he has hx of elev chol. for past 14 hours he has had severe pain in rlq. he went to an urgent care but was sent to er for eval to ro acute appy  not s smoker is social drinker no allergies no pshx  no travel no trauma

## 2022-06-24 NOTE — BRIEF OPERATIVE NOTE - PRE-OP DX
Acute appendicitis with localized peritonitis  10/21/2017    Active  Lupe Rubio
General Sunscreen Counseling: I recommended a broad spectrum sunscreen with a SPF of 30 or higher. I explained that SPF 30 sunscreens block approximately 97 percent of the sun's harmful rays. Sunscreens should be applied at least 15 minutes prior to expected sun exposure and then every 2 hours after that as long as sun exposure continues. If swimming or exercising sunscreen should be reapplied every 45 minutes to an hour after getting wet or sweating. One ounce, or the equivalent of a shot glass full of sunscreen, is adequate to protect the skin not covered by a bathing suit. I also recommended a lip balm with a sunscreen as well. Sun protective clothing can be used in lieu of sunscreen but must be worn the entire time you are exposed to the sun's rays.
Products Recommended: Zinc sunscreen
Detail Level: Generalized

## 2024-06-20 ENCOUNTER — NON-APPOINTMENT (OUTPATIENT)
Age: 49
End: 2024-06-20

## 2024-06-20 PROBLEM — K37 UNSPECIFIED APPENDICITIS: Chronic | Status: ACTIVE | Noted: 2018-11-29

## 2024-06-20 PROBLEM — Z00.00 ENCOUNTER FOR PREVENTIVE HEALTH EXAMINATION: Status: ACTIVE | Noted: 2024-06-20

## 2024-06-21 ENCOUNTER — APPOINTMENT (OUTPATIENT)
Dept: CARDIOLOGY | Facility: CLINIC | Age: 49
End: 2024-06-21

## 2024-06-21 ENCOUNTER — NON-APPOINTMENT (OUTPATIENT)
Age: 49
End: 2024-06-21

## 2024-06-21 ENCOUNTER — TRANSCRIPTION ENCOUNTER (OUTPATIENT)
Age: 49
End: 2024-06-21

## 2024-06-21 VITALS
OXYGEN SATURATION: 98 % | WEIGHT: 207 LBS | HEART RATE: 78 BPM | HEIGHT: 69 IN | SYSTOLIC BLOOD PRESSURE: 120 MMHG | DIASTOLIC BLOOD PRESSURE: 80 MMHG | BODY MASS INDEX: 30.66 KG/M2

## 2024-06-21 DIAGNOSIS — I47.19 OTHER SUPRAVENTRICULAR TACHYCARDIA: ICD-10-CM

## 2024-06-21 DIAGNOSIS — E78.5 HYPERLIPIDEMIA, UNSPECIFIED: ICD-10-CM

## 2024-06-21 DIAGNOSIS — Z00.00 ENCOUNTER FOR GENERAL ADULT MEDICAL EXAMINATION W/OUT ABNORMAL FINDINGS: ICD-10-CM

## 2024-06-21 PROCEDURE — 99204 OFFICE O/P NEW MOD 45 MIN: CPT

## 2024-06-21 PROCEDURE — G2211 COMPLEX E/M VISIT ADD ON: CPT

## 2024-06-21 PROCEDURE — 93000 ELECTROCARDIOGRAM COMPLETE: CPT

## 2024-07-02 ENCOUNTER — APPOINTMENT (OUTPATIENT)
Dept: CARDIOLOGY | Facility: CLINIC | Age: 49
End: 2024-07-02
Payer: COMMERCIAL

## 2024-07-02 PROCEDURE — 93306 TTE W/DOPPLER COMPLETE: CPT

## 2024-07-23 ENCOUNTER — APPOINTMENT (OUTPATIENT)
Dept: CARDIOLOGY | Facility: CLINIC | Age: 49
End: 2024-07-23

## 2024-08-02 ENCOUNTER — APPOINTMENT (OUTPATIENT)
Dept: CARDIOLOGY | Facility: CLINIC | Age: 49
End: 2024-08-02

## 2024-08-06 ENCOUNTER — APPOINTMENT (OUTPATIENT)
Dept: CARDIOLOGY | Facility: CLINIC | Age: 49
End: 2024-08-06

## 2024-08-06 PROCEDURE — 99215 OFFICE O/P EST HI 40 MIN: CPT

## 2024-08-06 PROCEDURE — G2211 COMPLEX E/M VISIT ADD ON: CPT

## 2025-02-24 NOTE — DISCHARGE NOTE ADULT - CAREGIVER NAME
02/24/2025  Amina Israel is a 65 y.o., female.      Pre-op Assessment    I have reviewed the Patient Summary Reports.     I have reviewed the Nursing Notes. I have reviewed the NPO Status.   I have reviewed the Medications.     Review of Systems  Anesthesia Hx:  No problems with previous Anesthesia             Denies Family Hx of Anesthesia complications.    Denies Personal Hx of Anesthesia complications.                    Social:  Non-Smoker       Hematology/Oncology:  Hematology Normal   Oncology Normal                                   EENT/Dental:  EENT/Dental Normal           Cardiovascular:     Hypertension              ECG has been reviewed.                      Hypertension         Pulmonary:  Pulmonary Normal                       Renal/:  Renal/ Normal                 Hepatic/GI:     GERD         Gerd          Musculoskeletal:  Arthritis        Arthritis          Neurological:  Neurology Normal              Arthritis                           Endocrine:  Diabetes Hypothyroidism   Diabetes                   Hypothyroidism          Dermatological:  Skin Normal    Psych:  Psychiatric Normal                Physical Exam  General: Well nourished    Airway:  Mallampati: II   Mouth Opening: Normal  TM Distance: Normal  Tongue: Normal  Neck ROM: Normal ROM    Dental:  Intact    Anesthesia Plan  Type of Anesthesia, risks & benefits discussed:    Anesthesia Type: MAC  Intra-op Monitoring Plan: Standard ASA Monitors  Post Op Pain Control Plan: multimodal analgesia  Induction:  IV  Informed Consent: Informed consent signed with the Patient and all parties understand the risks and agree with anesthesia plan.  All questions answered. Patient consented to blood products? Yes  ASA Score: 3  Day of Surgery Review of History & Physical: H&P Update referred to the surgeon/provider.I have interviewed and examined  the patient. I have reviewed the patient's H&P dated: There are no significant changes.     Ready For Surgery From Anesthesia Perspective.   .       as above